# Patient Record
Sex: FEMALE | Race: OTHER | Employment: UNEMPLOYED | URBAN - METROPOLITAN AREA
[De-identification: names, ages, dates, MRNs, and addresses within clinical notes are randomized per-mention and may not be internally consistent; named-entity substitution may affect disease eponyms.]

---
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
## 2019-03-26 ENCOUNTER — TELEPHONE (OUTPATIENT)
Dept: PEDIATRICS CLINIC | Facility: CLINIC | Age: 19
End: 2019-03-26

## 2019-03-26 NOTE — TELEPHONE ENCOUNTER
To provider for medication refill review; Pharmacy refill request.   Per Med List;   Doxycycline Hyclate 100mg Oral Cap     Well-exam with provider on 3/9/18     Okay for fill? Have patient follow up with adult medicine?

## 2019-03-26 NOTE — TELEPHONE ENCOUNTER
needs adult  Doctor now, would have run out by October 2018, so will not refill, she had time over both breaks and spring break to establish care, so needs to establish care

## 2019-08-19 ENCOUNTER — OFFICE VISIT (OUTPATIENT)
Dept: OBGYN CLINIC | Facility: CLINIC | Age: 19
End: 2019-08-19
Payer: COMMERCIAL

## 2019-08-19 VITALS
WEIGHT: 116 LBS | HEIGHT: 61 IN | SYSTOLIC BLOOD PRESSURE: 107 MMHG | BODY MASS INDEX: 21.9 KG/M2 | HEART RATE: 61 BPM | DIASTOLIC BLOOD PRESSURE: 71 MMHG

## 2019-08-19 DIAGNOSIS — Z01.419 WELL WOMAN EXAM WITH ROUTINE GYNECOLOGICAL EXAM: Primary | ICD-10-CM

## 2019-08-19 DIAGNOSIS — Z30.09 ENCOUNTER FOR COUNSELING REGARDING CONTRACEPTION: ICD-10-CM

## 2019-08-19 PROCEDURE — 99385 PREV VISIT NEW AGE 18-39: CPT | Performed by: CLINICAL NURSE SPECIALIST

## 2019-08-19 RX ORDER — CHOLECALCIFEROL (VITAMIN D3) 125 MCG
500 CAPSULE ORAL AS NEEDED
COMMUNITY

## 2019-08-19 RX ORDER — ETONOGESTREL AND ETHINYL ESTRADIOL 11.7; 2.7 MG/1; MG/1
1 INSERT, EXTENDED RELEASE VAGINAL
Qty: 1 EACH | Refills: 3 | Status: SHIPPED | OUTPATIENT
Start: 2019-08-19 | End: 2020-09-30

## 2019-08-19 NOTE — PROGRESS NOTES
Laurie Sandoval is a 22year old female No obstetric history on file. Patient's last menstrual period was 08/06/2019 (exact date). Patient presents with: Annual  New pt. 1st annual exam. No pap hx d/t age. Periods are regular.  Sexually active with new pa Relationships      Social connections:        Talks on phone: Not on file        Gets together: Not on file        Attends Muslim service: Not on file        Active member of club or organization: Not on file        Attends meetings of clubs or [place] denies excessive thirst or urination. Heme/Lymph:  denies history of anemia, easy bruising or bleeding.       PHYSICAL EXAM:   Constitutional: well developed, well nourished  Head/Face: normocephalic  Neck/Thyroid: thyroid symmetric, no thyromegaly, no nod week and use of NuvaRing Marquis. Back up x 1 month  Monthly self breast exams.    Yearly exams encouraged  RTO in 3-4 months for BP check

## 2019-09-04 ENCOUNTER — TELEPHONE (OUTPATIENT)
Dept: OBGYN CLINIC | Facility: CLINIC | Age: 19
End: 2019-09-04

## 2019-09-04 NOTE — TELEPHONE ENCOUNTER
Received PA form from [facility] pharmacy for her nuvaring that was prescribed by LIN. Spoke with Mary from [place] number provided ([phone]).  Mary stated that since pt is still listed as a child under her parents insurance, they are unable to initi

## 2019-09-13 NOTE — TELEPHONE ENCOUNTER
RECEIVED FAX FROM [employer]. COMPLETED THE FORM THEY SENT AND FAXED BACK. FORM IN FAX BIN AWAITING RSPONSE.

## 2019-09-16 NOTE — TELEPHONE ENCOUNTER
Pt states she is not under her father's insurance currently. Her father is in the process of getting her on his insurance. Pt states that once she is on his insurance, she will call us. Pt would want to start the [address].   (Currently she has no insuran

## 2019-09-16 NOTE — TELEPHONE ENCOUNTER
LMTCB. Pt needs to find out from insurance what birth control is covered with her insurance and fax it to [facility].

## 2019-10-22 ENCOUNTER — TELEPHONE (OUTPATIENT)
Dept: OBGYN CLINIC | Facility: CLINIC | Age: 19
End: 2019-10-22

## 2019-10-22 RX ORDER — ETONOGESTREL AND ETHINYL ESTRADIOL VAGINAL .015; .12 MG/D; MG/D
1 RING VAGINAL
Qty: 1 EACH | Refills: 11 | Status: CANCELLED | OUTPATIENT
Start: 2019-10-22

## 2019-10-22 NOTE — TELEPHONE ENCOUNTER
PER PATIENT IS AWAY AT SCHOOL / REQUESTING TO HAVE HER SCRIPT SEND TO ANOTHER PHARMACY / Valentin Mathias IN [facility] / Zulma Medico / PHARMACY # [phone] / Felicia Brewster / Caro Gambino

## 2019-10-22 NOTE — TELEPHONE ENCOUNTER
Called pt and informed her that I called pharmacy, [address] [place] [address] at [facility]. Informed pt that the pharmacy stated that she picked up 3 Nuvarings on 8/25/19 and had one transferred to [facility]. Pt was informed that she needs to return Nov or Dec while on active Nuvaring for BP check with MAF. Our call was disconnected. Tried to call pt back and it would not ring. Pt needs to be called again to see if she has any questions.

## 2020-09-30 ENCOUNTER — OFFICE VISIT (OUTPATIENT)
Dept: OBGYN CLINIC | Facility: CLINIC | Age: 20
End: 2020-09-30
Payer: COMMERCIAL

## 2020-09-30 VITALS
BODY MASS INDEX: 21 KG/M2 | HEART RATE: 83 BPM | WEIGHT: 110.81 LBS | DIASTOLIC BLOOD PRESSURE: 76 MMHG | SYSTOLIC BLOOD PRESSURE: 124 MMHG

## 2020-09-30 DIAGNOSIS — Z12.4 CERVICAL CANCER SCREENING: ICD-10-CM

## 2020-09-30 DIAGNOSIS — Z13.9 ENCOUNTER FOR HEALTH-RELATED SCREENING: ICD-10-CM

## 2020-09-30 DIAGNOSIS — Z76.0 MEDICATION REFILL: ICD-10-CM

## 2020-09-30 DIAGNOSIS — Z01.419 WELL WOMAN EXAM WITH ROUTINE GYNECOLOGICAL EXAM: Primary | ICD-10-CM

## 2020-09-30 PROCEDURE — 3078F DIAST BP <80 MM HG: CPT | Performed by: CLINICAL NURSE SPECIALIST

## 2020-09-30 PROCEDURE — 99395 PREV VISIT EST AGE 18-39: CPT | Performed by: CLINICAL NURSE SPECIALIST

## 2020-09-30 PROCEDURE — 3074F SYST BP LT 130 MM HG: CPT | Performed by: CLINICAL NURSE SPECIALIST

## 2020-09-30 RX ORDER — ETONOGESTREL AND ETHINYL ESTRADIOL 11.7; 2.7 MG/1; MG/1
1 INSERT, EXTENDED RELEASE VAGINAL
Qty: 3 RING | Refills: 4 | Status: SHIPPED | OUTPATIENT
Start: 2020-09-30 | End: 2022-01-24

## 2020-10-05 NOTE — PROGRESS NOTES
Elta Leventhal is a 23year old female [facility] Patient's last menstrual period was 09/03/2020. Patient presents with:  Gyn Exam: ANNUAL EXAM   Medication Request: Ericka Avelar   Last annual exam was last year. Will do 1st pap today.  Periods are regular with together: Not on file        Attends Taoist service: Not on file        Active member of club or organization: Not on file        Attends meetings of clubs or organizations: Not on file        Relationship status: Not on file      Intimate partner viole urination. Heme/Lymph:  denies history of anemia, easy bruising or bleeding. PHYSICAL EXAM:   /76   Pulse 83   Wt 110 lb 12.8 oz (50.3 kg)   LMP 09/03/2020   BMI 20.94 kg/m²     Body mass index is 20.94 kg/m².     Constitutional: well developed, urged. Declines STD testing  Exercise and healthy lifestyle discussed  Refill rx for NuvaRing sent  Monthly self breast exams.    Yearly exams encouraged

## 2021-08-01 ENCOUNTER — APPOINTMENT (OUTPATIENT)
Dept: GENERAL RADIOLOGY | Age: 21
End: 2021-08-01
Attending: NURSE PRACTITIONER
Payer: COMMERCIAL

## 2021-08-01 ENCOUNTER — HOSPITAL ENCOUNTER (OUTPATIENT)
Age: 21
Discharge: HOME | End: 2021-08-01
Payer: COMMERCIAL

## 2021-08-01 VITALS
WEIGHT: 115 LBS | TEMPERATURE: 99 F | HEIGHT: 61 IN | SYSTOLIC BLOOD PRESSURE: 121 MMHG | OXYGEN SATURATION: 100 % | DIASTOLIC BLOOD PRESSURE: 77 MMHG | HEART RATE: 92 BPM | BODY MASS INDEX: 21.71 KG/M2 | RESPIRATION RATE: 20 BRPM

## 2021-08-01 DIAGNOSIS — R05.9 COUGH: Primary | ICD-10-CM

## 2021-08-01 LAB — S PYO AG THROAT QL: NEGATIVE

## 2021-08-01 PROCEDURE — 87880 STREP A ASSAY W/OPTIC: CPT | Performed by: NURSE PRACTITIONER

## 2021-08-01 PROCEDURE — 71046 X-RAY EXAM CHEST 2 VIEWS: CPT | Performed by: NURSE PRACTITIONER

## 2021-08-01 PROCEDURE — 99203 OFFICE O/P NEW LOW 30 MIN: CPT | Performed by: NURSE PRACTITIONER

## 2021-08-01 RX ORDER — BENZONATATE 100 MG/1
100 CAPSULE ORAL 3 TIMES DAILY PRN
Qty: 30 CAPSULE | Refills: 0 | Status: SHIPPED | OUTPATIENT
Start: 2021-08-01 | End: 2021-08-31

## 2021-08-01 RX ORDER — ALBUTEROL SULFATE 90 UG/1
2 AEROSOL, METERED RESPIRATORY (INHALATION) EVERY 4 HOURS PRN
Qty: 1 EACH | Refills: 0 | Status: SHIPPED | OUTPATIENT
Start: 2021-08-01 | End: 2021-08-31

## 2021-08-01 RX ORDER — ACETAMINOPHEN 500 MG
1000 TABLET ORAL ONCE
Status: COMPLETED | OUTPATIENT
Start: 2021-08-01 | End: 2021-08-01

## 2021-08-01 NOTE — ED PROVIDER NOTES
Patient Seen in: [facility] [place]      History   Patient presents with:  Cough  Finger Pain    Stated Complaint: URI and nail pain on pinkie    HPI/Subjective:   21yo female to IC with c/o cough for the last 4 days. Low grade fever.  Had covid test distress. Appearance: Normal appearance. She is normal weight. She is not ill-appearing or toxic-appearing. HENT:      Head: Normocephalic and atraumatic. Nose: Congestion present. No rhinorrhea.       Mouth/Throat:      Mouth: Mucous membranes a evident acute intrathoracic process. Remote.               Dictated by (CST): Asa Corea MD on 8/01/2021 at 2:50 PM         Finalized by (CST): Asa Corea MD on 8/01/2021 at 2:50 PM                        MDM   Strep negative  Ch

## 2021-08-01 NOTE — ED INITIAL ASSESSMENT (HPI)
Pt complaining of cough and congestion with post nasal drip since Tuesday, pt was in [address] at the time and just got back yesterday. Pt was taking otc Medications from [place].  No fever. Pt has left 5th finger pain from playing volleyball.   The nail li

## 2021-08-02 LAB — SARS-COV-2 RNA RESP QL NAA+PROBE: NOT DETECTED

## 2021-08-04 ENCOUNTER — TELEMEDICINE (OUTPATIENT)
Dept: INTERNAL MEDICINE CLINIC | Facility: CLINIC | Age: 21
End: 2021-08-04

## 2021-08-04 DIAGNOSIS — J01.90 ACUTE NON-RECURRENT SINUSITIS, UNSPECIFIED LOCATION: Primary | ICD-10-CM

## 2021-08-04 DIAGNOSIS — R50.9 FEVER, UNSPECIFIED FEVER CAUSE: ICD-10-CM

## 2021-08-04 PROCEDURE — 99203 OFFICE O/P NEW LOW 30 MIN: CPT | Performed by: INTERNAL MEDICINE

## 2021-08-04 RX ORDER — AZITHROMYCIN 250 MG/1
TABLET, FILM COATED ORAL
Qty: 6 TABLET | Refills: 0 | Status: SHIPPED | OUTPATIENT
Start: 2021-08-04 | End: 2021-08-09

## 2021-08-04 NOTE — PROGRESS NOTES
Patient ID: Roz Byrd is a 23year old female. Patient presents with: Follow - Up         HISTORY OF PRESENT ILLNESS:   Patient presents for above. This visit is conducted using Telemedicine with live, interactive video and audio.   Patient w Allergies:No Known Allergies    Social History    Socioeconomic History      Marital status: Single      Spouse name: Not on file      Number of children: Not on file      Years of education: Not on file      Highest education level: Not on file    Occ location  · azithromycin 250 MG Oral Tab; Take 2 tablets (500 mg total) by mouth daily for 1 day, THEN 1 tablet (250 mg total) daily for 4 days. Dispense: 6 tablet; Refill: 0  · Symptomatic treatment with hot showers, stimulation, fluids, Tamika Mccollum.   · and other related consent forms and documents. which are located on [facility]. The patient verbally agreed to telehealth consent form, related consents and the risks discussed. Lastly, the patient confirmed that they were in [place].    Included

## 2022-01-20 DIAGNOSIS — Z76.0 MEDICATION REFILL: ICD-10-CM

## 2022-01-20 RX ORDER — ETONOGESTREL AND ETHINYL ESTRADIOL .12; .015 MG/D; MG/D
RING VAGINAL
Qty: 1 RING | Refills: 13 | OUTPATIENT
Start: 2022-01-20

## 2022-01-20 NOTE — TELEPHONE ENCOUNTER
Requested Prescriptions     Pending Prescriptions Disp Refills   • [address] 0.12-0.015 MG/24HR Vaginal Ring [Pharmacy Med Name: Lastcurtisu 60 1 ring 13     Sig: INSERT 1 RING VAGINALLY EVERY 28 DAYS     Last annual 9/30/20  Last filled 9/30/20  Pap

## 2022-01-21 DIAGNOSIS — Z76.0 MEDICATION REFILL: ICD-10-CM

## 2022-01-22 RX ORDER — ETONOGESTREL AND ETHINYL ESTRADIOL 11.7; 2.7 MG/1; MG/1
1 INSERT, EXTENDED RELEASE VAGINAL
Qty: 3 RING | Refills: 0 | Status: CANCELLED | OUTPATIENT
Start: 2022-01-22

## 2022-01-22 NOTE — TELEPHONE ENCOUNTER
Last annual - 9/30/2020  Last pap - 9/30/2020, normal      Annual scheduled on 3/30/2022. Message to [facility]. Ok to fill rx until appt? Rx pended.

## 2022-01-23 ENCOUNTER — PATIENT MESSAGE (OUTPATIENT)
Dept: OBGYN CLINIC | Facility: CLINIC | Age: 22
End: 2022-01-23

## 2022-01-23 DIAGNOSIS — Z76.0 MEDICATION REFILL: ICD-10-CM

## 2022-01-24 RX ORDER — ETONOGESTREL AND ETHINYL ESTRADIOL 11.7; 2.7 MG/1; MG/1
1 INSERT, EXTENDED RELEASE VAGINAL
Qty: 3 RING | Refills: 0 | Status: SHIPPED | OUTPATIENT
Start: 2022-01-24

## 2022-01-24 NOTE — TELEPHONE ENCOUNTER
Requested Prescriptions     Pending Prescriptions Disp Refills   • Etonogestrel-Ethinyl Estradiol (NUVARING) 0.12-0.015 MG/24HR Vaginal Ring 3 ring 4     Sig: Place 1 ring vaginally every 28 days.      Last annual 9/30/20  Last filled 9/30/20  Pap UTD    Ne

## 2022-04-06 ENCOUNTER — LAB ENCOUNTER (OUTPATIENT)
Dept: LAB | Facility: HOSPITAL | Age: 22
End: 2022-04-06
Attending: CLINICAL NURSE SPECIALIST
Payer: COMMERCIAL

## 2022-04-06 ENCOUNTER — OFFICE VISIT (OUTPATIENT)
Dept: OBGYN CLINIC | Facility: CLINIC | Age: 22
End: 2022-04-06
Payer: COMMERCIAL

## 2022-04-06 VITALS
HEIGHT: 61 IN | WEIGHT: 121 LBS | DIASTOLIC BLOOD PRESSURE: 78 MMHG | BODY MASS INDEX: 22.84 KG/M2 | HEART RATE: 106 BPM | SYSTOLIC BLOOD PRESSURE: 123 MMHG

## 2022-04-06 DIAGNOSIS — Z76.0 MEDICATION REFILL: ICD-10-CM

## 2022-04-06 DIAGNOSIS — R20.8 COLD HANDS AND FEET WITHOUT PERIPHERAL VASCULAR DISEASE: ICD-10-CM

## 2022-04-06 DIAGNOSIS — Z01.419 WELL WOMAN EXAM WITH ROUTINE GYNECOLOGICAL EXAM: Primary | ICD-10-CM

## 2022-04-06 LAB
BASOPHILS # BLD AUTO: 0.03 X10(3) UL (ref 0–0.2)
BASOPHILS NFR BLD AUTO: 0.4 %
DEPRECATED HBV CORE AB SER IA-ACNC: 13.8 NG/ML
DEPRECATED RDW RBC AUTO: 40.5 FL (ref 35.1–46.3)
EOSINOPHIL # BLD AUTO: 0.04 X10(3) UL (ref 0–0.7)
EOSINOPHIL NFR BLD AUTO: 0.5 %
ERYTHROCYTE [DISTWIDTH] IN BLOOD BY AUTOMATED COUNT: 12.8 % (ref 11–15)
HCT VFR BLD AUTO: 37.4 %
HGB BLD-MCNC: 12.2 G/DL
IMM GRANULOCYTES # BLD AUTO: 0.02 X10(3) UL (ref 0–1)
IMM GRANULOCYTES NFR BLD: 0.3 %
IRON SATN MFR SERPL: 14 %
IRON SERPL-MCNC: 83 UG/DL
LYMPHOCYTES # BLD AUTO: 2.67 X10(3) UL (ref 1–4)
LYMPHOCYTES NFR BLD AUTO: 35.2 %
MCH RBC QN AUTO: 28.5 PG (ref 26–34)
MCHC RBC AUTO-ENTMCNC: 32.6 G/DL (ref 31–37)
MCV RBC AUTO: 87.4 FL
MONOCYTES # BLD AUTO: 0.5 X10(3) UL (ref 0.1–1)
MONOCYTES NFR BLD AUTO: 6.6 %
NEUTROPHILS # BLD AUTO: 4.32 X10 (3) UL (ref 1.5–7.7)
NEUTROPHILS # BLD AUTO: 4.32 X10(3) UL (ref 1.5–7.7)
NEUTROPHILS NFR BLD AUTO: 57 %
PLATELET # BLD AUTO: 268 10(3)UL (ref 150–450)
RBC # BLD AUTO: 4.28 X10(6)UL
T4 FREE SERPL-MCNC: 0.9 NG/DL (ref 0.8–1.7)
TIBC SERPL-MCNC: 587 UG/DL (ref 240–450)
TRANSFERRIN SERPL-MCNC: 394 MG/DL (ref 200–360)
TSI SER-ACNC: 0.96 MIU/ML (ref 0.36–3.74)
WBC # BLD AUTO: 7.6 X10(3) UL (ref 4–11)

## 2022-04-06 PROCEDURE — 84443 ASSAY THYROID STIM HORMONE: CPT

## 2022-04-06 PROCEDURE — 3074F SYST BP LT 130 MM HG: CPT | Performed by: CLINICAL NURSE SPECIALIST

## 2022-04-06 PROCEDURE — 84466 ASSAY OF TRANSFERRIN: CPT

## 2022-04-06 PROCEDURE — 84439 ASSAY OF FREE THYROXINE: CPT

## 2022-04-06 PROCEDURE — 3078F DIAST BP <80 MM HG: CPT | Performed by: CLINICAL NURSE SPECIALIST

## 2022-04-06 PROCEDURE — 36415 COLL VENOUS BLD VENIPUNCTURE: CPT

## 2022-04-06 PROCEDURE — 82728 ASSAY OF FERRITIN: CPT

## 2022-04-06 PROCEDURE — 83540 ASSAY OF IRON: CPT

## 2022-04-06 PROCEDURE — 85025 COMPLETE CBC W/AUTO DIFF WBC: CPT

## 2022-04-06 PROCEDURE — 99395 PREV VISIT EST AGE 18-39: CPT | Performed by: CLINICAL NURSE SPECIALIST

## 2022-04-06 PROCEDURE — 3008F BODY MASS INDEX DOCD: CPT | Performed by: CLINICAL NURSE SPECIALIST

## 2022-04-06 RX ORDER — ETONOGESTREL AND ETHINYL ESTRADIOL 11.7; 2.7 MG/1; MG/1
1 INSERT, EXTENDED RELEASE VAGINAL
Qty: 1 RING | Refills: 14 | Status: SHIPPED | OUTPATIENT
Start: 2022-04-06

## 2022-04-18 ENCOUNTER — HOSPITAL ENCOUNTER (OUTPATIENT)
Age: 22
Discharge: HOME | End: 2022-04-18
Payer: COMMERCIAL

## 2022-04-18 VITALS
HEART RATE: 83 BPM | BODY MASS INDEX: 23.03 KG/M2 | WEIGHT: 122 LBS | RESPIRATION RATE: 20 BRPM | DIASTOLIC BLOOD PRESSURE: 81 MMHG | OXYGEN SATURATION: 99 % | SYSTOLIC BLOOD PRESSURE: 124 MMHG | TEMPERATURE: 98 F | HEIGHT: 61 IN

## 2022-04-18 DIAGNOSIS — J06.9 VIRAL UPPER RESPIRATORY TRACT INFECTION: ICD-10-CM

## 2022-04-18 DIAGNOSIS — Z20.822 ENCOUNTER FOR LABORATORY TESTING FOR COVID-19 VIRUS: Primary | ICD-10-CM

## 2022-04-18 DIAGNOSIS — J02.9 VIRAL PHARYNGITIS: ICD-10-CM

## 2022-04-18 LAB
POCT MONO: NEGATIVE
S PYO AG THROAT QL: NEGATIVE
SARS-COV-2 RNA RESP QL NAA+PROBE: NOT DETECTED

## 2022-04-18 PROCEDURE — 87880 STREP A ASSAY W/OPTIC: CPT

## 2022-04-18 PROCEDURE — U0002 COVID-19 LAB TEST NON-CDC: HCPCS

## 2022-04-18 PROCEDURE — 99213 OFFICE O/P EST LOW 20 MIN: CPT

## 2022-04-18 PROCEDURE — 36415 COLL VENOUS BLD VENIPUNCTURE: CPT

## 2022-04-18 PROCEDURE — 86308 HETEROPHILE ANTIBODY SCREEN: CPT

## 2022-09-06 ENCOUNTER — HOSPITAL ENCOUNTER (OUTPATIENT)
Age: 22
Discharge: HOME | End: 2022-09-06
Payer: COMMERCIAL

## 2022-09-06 PROCEDURE — 90471 IMMUNIZATION ADMIN: CPT | Performed by: NURSE PRACTITIONER

## 2022-09-06 PROCEDURE — 90675 RABIES VACCINE IM: CPT | Performed by: NURSE PRACTITIONER

## 2022-09-06 RX ORDER — AMOXICILLIN AND CLAVULANATE POTASSIUM 875; 125 MG/1; MG/1
TABLET, FILM COATED ORAL 2 TIMES DAILY
COMMUNITY
Start: 2022-09-04

## 2022-09-06 NOTE — ED INITIAL ASSESSMENT (HPI)
Patient here for her 2nd Rabies vaccine. She started her series at [facility] in [address] [place] [address] following being bitten in the right hand by a samano barn cat.

## 2022-09-06 NOTE — ED QUICK NOTES
Patient tolerated the rabies vaccine to the left deltoid without any problems. She is aware to return on 9/10/22 for her 3rd vaccine.

## 2022-09-10 ENCOUNTER — HOSPITAL ENCOUNTER (OUTPATIENT)
Age: 22
Discharge: HOME | End: 2022-09-10
Payer: COMMERCIAL

## 2022-09-18 ENCOUNTER — HOSPITAL ENCOUNTER (OUTPATIENT)
Age: 22
Discharge: HOME | End: 2022-09-18
Payer: COMMERCIAL

## 2022-09-18 ENCOUNTER — TELEPHONE (OUTPATIENT)
Dept: CASE MANAGEMENT | Facility: HOSPITAL | Age: 22
End: 2022-09-18

## 2022-09-18 VITALS — TEMPERATURE: 99 F

## 2022-12-29 ENCOUNTER — OFFICE VISIT (OUTPATIENT)
Dept: FAMILY MEDICINE CLINIC | Facility: CLINIC | Age: 22
End: 2022-12-29
Payer: COMMERCIAL

## 2022-12-29 VITALS
WEIGHT: 120 LBS | SYSTOLIC BLOOD PRESSURE: 112 MMHG | HEART RATE: 83 BPM | DIASTOLIC BLOOD PRESSURE: 73 MMHG | HEIGHT: 61 IN | BODY MASS INDEX: 22.66 KG/M2

## 2022-12-29 DIAGNOSIS — Z76.89 ENCOUNTER TO ESTABLISH CARE: Primary | ICD-10-CM

## 2022-12-29 DIAGNOSIS — J35.8 TONSILLITH: ICD-10-CM

## 2022-12-29 PROCEDURE — 3074F SYST BP LT 130 MM HG: CPT | Performed by: STUDENT IN AN ORGANIZED HEALTH CARE EDUCATION/TRAINING PROGRAM

## 2022-12-29 PROCEDURE — 3078F DIAST BP <80 MM HG: CPT | Performed by: STUDENT IN AN ORGANIZED HEALTH CARE EDUCATION/TRAINING PROGRAM

## 2022-12-29 PROCEDURE — 99202 OFFICE O/P NEW SF 15 MIN: CPT | Performed by: STUDENT IN AN ORGANIZED HEALTH CARE EDUCATION/TRAINING PROGRAM

## 2022-12-29 PROCEDURE — 3008F BODY MASS INDEX DOCD: CPT | Performed by: STUDENT IN AN ORGANIZED HEALTH CARE EDUCATION/TRAINING PROGRAM

## 2022-12-29 RX ORDER — FLUTICASONE PROPIONATE 50 MCG
1 SPRAY, SUSPENSION (ML) NASAL DAILY
Qty: 1 EACH | Refills: 0 | Status: SHIPPED | OUTPATIENT
Start: 2022-12-29

## 2023-01-03 ENCOUNTER — OFFICE VISIT (OUTPATIENT)
Dept: OTOLARYNGOLOGY | Facility: CLINIC | Age: 23
End: 2023-01-03
Payer: COMMERCIAL

## 2023-01-03 VITALS — BODY MASS INDEX: 23 KG/M2 | WEIGHT: 120 LBS

## 2023-01-03 DIAGNOSIS — J35.8 TONSILLITH: Primary | ICD-10-CM

## 2023-01-03 PROCEDURE — 99204 OFFICE O/P NEW MOD 45 MIN: CPT | Performed by: STUDENT IN AN ORGANIZED HEALTH CARE EDUCATION/TRAINING PROGRAM

## 2023-11-20 ENCOUNTER — OFFICE VISIT (OUTPATIENT)
Dept: OBGYN CLINIC | Facility: CLINIC | Age: 23
End: 2023-11-20

## 2023-11-20 ENCOUNTER — LAB ENCOUNTER (OUTPATIENT)
Dept: LAB | Age: 23
End: 2023-11-20
Attending: STUDENT IN AN ORGANIZED HEALTH CARE EDUCATION/TRAINING PROGRAM
Payer: COMMERCIAL

## 2023-11-20 VITALS
HEART RATE: 70 BPM | WEIGHT: 128 LBS | SYSTOLIC BLOOD PRESSURE: 111 MMHG | BODY MASS INDEX: 24.17 KG/M2 | DIASTOLIC BLOOD PRESSURE: 72 MMHG | HEIGHT: 61 IN

## 2023-11-20 DIAGNOSIS — Z01.419 WELL WOMAN EXAM WITH ROUTINE GYNECOLOGICAL EXAM: ICD-10-CM

## 2023-11-20 DIAGNOSIS — Z11.3 SCREENING EXAMINATION FOR STD (SEXUALLY TRANSMITTED DISEASE): ICD-10-CM

## 2023-11-20 DIAGNOSIS — Z12.4 SCREENING FOR CERVICAL CANCER: Primary | ICD-10-CM

## 2023-11-20 LAB
BASOPHILS # BLD AUTO: 0.03 X10(3) UL (ref 0–0.2)
BASOPHILS NFR BLD AUTO: 0.4 %
DEPRECATED HBV CORE AB SER IA-ACNC: 19.8 NG/ML
DEPRECATED RDW RBC AUTO: 39.4 FL (ref 35.1–46.3)
EOSINOPHIL # BLD AUTO: 0.07 X10(3) UL (ref 0–0.7)
EOSINOPHIL NFR BLD AUTO: 1 %
ERYTHROCYTE [DISTWIDTH] IN BLOOD BY AUTOMATED COUNT: 12.1 % (ref 11–15)
HCT VFR BLD AUTO: 37.9 %
HGB BLD-MCNC: 12.6 G/DL
IMM GRANULOCYTES # BLD AUTO: 0.01 X10(3) UL (ref 0–1)
IMM GRANULOCYTES NFR BLD: 0.1 %
LYMPHOCYTES # BLD AUTO: 2.54 X10(3) UL (ref 1–4)
LYMPHOCYTES NFR BLD AUTO: 37.9 %
MCH RBC QN AUTO: 29.6 PG (ref 26–34)
MCHC RBC AUTO-ENTMCNC: 33.2 G/DL (ref 31–37)
MCV RBC AUTO: 89 FL
MONOCYTES # BLD AUTO: 0.59 X10(3) UL (ref 0.1–1)
MONOCYTES NFR BLD AUTO: 8.8 %
NEUTROPHILS # BLD AUTO: 3.47 X10 (3) UL (ref 1.5–7.7)
NEUTROPHILS # BLD AUTO: 3.47 X10(3) UL (ref 1.5–7.7)
NEUTROPHILS NFR BLD AUTO: 51.8 %
PLATELET # BLD AUTO: 272 10(3)UL (ref 150–450)
RBC # BLD AUTO: 4.26 X10(6)UL
TSI SER-ACNC: 2.49 MIU/ML (ref 0.55–4.78)
WBC # BLD AUTO: 6.7 X10(3) UL (ref 4–11)

## 2023-11-20 PROCEDURE — 3078F DIAST BP <80 MM HG: CPT | Performed by: STUDENT IN AN ORGANIZED HEALTH CARE EDUCATION/TRAINING PROGRAM

## 2023-11-20 PROCEDURE — 3008F BODY MASS INDEX DOCD: CPT | Performed by: STUDENT IN AN ORGANIZED HEALTH CARE EDUCATION/TRAINING PROGRAM

## 2023-11-20 PROCEDURE — 36415 COLL VENOUS BLD VENIPUNCTURE: CPT

## 2023-11-20 PROCEDURE — 84443 ASSAY THYROID STIM HORMONE: CPT

## 2023-11-20 PROCEDURE — 82728 ASSAY OF FERRITIN: CPT

## 2023-11-20 PROCEDURE — 3074F SYST BP LT 130 MM HG: CPT | Performed by: STUDENT IN AN ORGANIZED HEALTH CARE EDUCATION/TRAINING PROGRAM

## 2023-11-20 PROCEDURE — 85025 COMPLETE CBC W/AUTO DIFF WBC: CPT

## 2023-11-20 PROCEDURE — 99385 PREV VISIT NEW AGE 18-39: CPT | Performed by: STUDENT IN AN ORGANIZED HEALTH CARE EDUCATION/TRAINING PROGRAM

## 2023-11-20 RX ORDER — TRETINOIN 0.5 MG/G
CREAM TOPICAL
COMMUNITY
Start: 2023-08-07

## 2023-11-20 RX ORDER — ETONOGESTREL AND ETHINYL ESTRADIOL VAGINAL .015; .12 MG/D; MG/D
1 RING VAGINAL
Qty: 1 RING | Refills: 11 | Status: SHIPPED | OUTPATIENT
Start: 2023-11-20

## 2023-11-20 RX ORDER — SPIRONOLACTONE 100 MG/1
TABLET, FILM COATED ORAL
COMMUNITY
Start: 2023-09-24

## 2023-11-21 LAB
C TRACH DNA SPEC QL NAA+PROBE: NEGATIVE
N GONORRHOEA DNA SPEC QL NAA+PROBE: NEGATIVE

## 2023-11-24 LAB — LAST PAP RESULT: NORMAL

## 2023-12-26 ENCOUNTER — TELEPHONE (OUTPATIENT)
Dept: FAMILY MEDICINE CLINIC | Facility: CLINIC | Age: 23
End: 2023-12-26

## 2024-07-10 ENCOUNTER — OFFICE VISIT (OUTPATIENT)
Dept: FAMILY MEDICINE CLINIC | Facility: CLINIC | Age: 24
End: 2024-07-10

## 2024-07-10 ENCOUNTER — LAB ENCOUNTER (OUTPATIENT)
Dept: LAB | Age: 24
End: 2024-07-10
Payer: COMMERCIAL

## 2024-07-10 VITALS
DIASTOLIC BLOOD PRESSURE: 69 MMHG | HEART RATE: 70 BPM | BODY MASS INDEX: 24.17 KG/M2 | TEMPERATURE: 98 F | RESPIRATION RATE: 16 BRPM | SYSTOLIC BLOOD PRESSURE: 104 MMHG | HEIGHT: 61 IN | WEIGHT: 128 LBS | OXYGEN SATURATION: 97 %

## 2024-07-10 DIAGNOSIS — Z00.00 ROUTINE PHYSICAL EXAMINATION: ICD-10-CM

## 2024-07-10 DIAGNOSIS — N92.6 MENSTRUAL IRREGULARITY: ICD-10-CM

## 2024-07-10 DIAGNOSIS — D22.9 ATYPICAL NEVI: ICD-10-CM

## 2024-07-10 DIAGNOSIS — Z00.00 ROUTINE PHYSICAL EXAMINATION: Primary | ICD-10-CM

## 2024-07-10 DIAGNOSIS — R51.9 NONINTRACTABLE EPISODIC HEADACHE, UNSPECIFIED HEADACHE TYPE: ICD-10-CM

## 2024-07-10 LAB
BASOPHILS # BLD AUTO: 0.03 X10(3) UL (ref 0–0.2)
BASOPHILS NFR BLD AUTO: 0.4 %
DEPRECATED HBV CORE AB SER IA-ACNC: 36.3 NG/ML
DEPRECATED RDW RBC AUTO: 39.6 FL (ref 35.1–46.3)
EOSINOPHIL # BLD AUTO: 0.1 X10(3) UL (ref 0–0.7)
EOSINOPHIL NFR BLD AUTO: 1.4 %
ERYTHROCYTE [DISTWIDTH] IN BLOOD BY AUTOMATED COUNT: 12.1 % (ref 11–15)
HCT VFR BLD AUTO: 38.6 %
HGB BLD-MCNC: 13.1 G/DL
IMM GRANULOCYTES # BLD AUTO: 0 X10(3) UL (ref 0–1)
IMM GRANULOCYTES NFR BLD: 0 %
LYMPHOCYTES # BLD AUTO: 2.37 X10(3) UL (ref 1–4)
LYMPHOCYTES NFR BLD AUTO: 32 %
MCH RBC QN AUTO: 30.5 PG (ref 26–34)
MCHC RBC AUTO-ENTMCNC: 33.9 G/DL (ref 31–37)
MCV RBC AUTO: 89.8 FL
MONOCYTES # BLD AUTO: 0.6 X10(3) UL (ref 0.1–1)
MONOCYTES NFR BLD AUTO: 8.1 %
NEUTROPHILS # BLD AUTO: 4.3 X10 (3) UL (ref 1.5–7.7)
NEUTROPHILS # BLD AUTO: 4.3 X10(3) UL (ref 1.5–7.7)
NEUTROPHILS NFR BLD AUTO: 58.1 %
PLATELET # BLD AUTO: 239 10(3)UL (ref 150–450)
RBC # BLD AUTO: 4.3 X10(6)UL
TSI SER-ACNC: 1.65 MIU/ML (ref 0.55–4.78)
WBC # BLD AUTO: 7.4 X10(3) UL (ref 4–11)

## 2024-07-10 PROCEDURE — 3008F BODY MASS INDEX DOCD: CPT

## 2024-07-10 PROCEDURE — 99395 PREV VISIT EST AGE 18-39: CPT

## 2024-07-10 PROCEDURE — 3078F DIAST BP <80 MM HG: CPT

## 2024-07-10 PROCEDURE — 36415 COLL VENOUS BLD VENIPUNCTURE: CPT

## 2024-07-10 PROCEDURE — 3074F SYST BP LT 130 MM HG: CPT

## 2024-07-10 PROCEDURE — 82728 ASSAY OF FERRITIN: CPT

## 2024-07-10 PROCEDURE — 84443 ASSAY THYROID STIM HORMONE: CPT

## 2024-07-10 PROCEDURE — 85025 COMPLETE CBC W/AUTO DIFF WBC: CPT

## 2024-07-10 NOTE — PROGRESS NOTES
HPI:    Patient ID: Sonal Townsend is a 23 year old female.    HPI  Chief Complaint   Patient presents with    Physical     Annual px     Patient here in office for physical. Pap completed on 11/2023 and was normal. Reports irregular menstrual cycles/bleeding when she misses a dose of spironolactone (take medication for acne).       Also complains of intermittent headaches, occurring for past 3-4 days this week. Denies nausea, vomiting, or light sensitivity . States she has recently starting adding small amount of coffee to her match and tea, unsure if related. Also not sleeping well at night and has increased screen time lately.     Requesting copy of immunization record.     Review of Systems   Constitutional: Negative.  Negative for fever.   HENT: Negative.  Negative for ear pain and sore throat.    Eyes: Negative.  Negative for visual disturbance.   Respiratory: Negative.  Negative for cough and shortness of breath.    Cardiovascular: Negative.  Negative for chest pain and palpitations.   Gastrointestinal: Negative.  Negative for abdominal pain, blood in stool, constipation and diarrhea.   Genitourinary:  Positive for menstrual problem. Negative for dysuria, frequency and hematuria.   Musculoskeletal: Negative.  Negative for arthralgias and myalgias.   Skin: Negative.  Negative for rash.   Neurological:  Positive for headaches. Negative for dizziness and facial asymmetry.   Psychiatric/Behavioral: Negative.         /69   Pulse 70   Temp 97.5 °F (36.4 °C) (Temporal)   Resp 16   Ht 5' 1\" (1.549 m)   Wt 128 lb (58.1 kg)   LMP 06/15/2024 (Approximate)   SpO2 97%   BMI 24.19 kg/m²     Past Medical History:    Dermatitis    Dysuria    Infectious mononucleosis     History reviewed. No pertinent surgical history.  Social History     Socioeconomic History    Marital status: Single     Spouse name: Not on file    Number of children: Not on file    Years of education: Not on file    Highest education  level: Not on file   Occupational History    Not on file   Tobacco Use    Smoking status: Never    Smokeless tobacco: Never   Vaping Use    Vaping status: Never Used   Substance and Sexual Activity    Alcohol use: Yes     Comment: socially     Drug use: Yes     Types: Cannabis     Comment: pt stated she smoked marijuana once     Sexual activity: Yes     Partners: Male     Birth control/protection: Inserts     Comment: same partner   Other Topics Concern    Not on file   Social History Narrative    9th Grade    Volleyball    Basketball    Soccer     Social Determinants of Health     Financial Resource Strain: Not on file   Food Insecurity: Not on file   Transportation Needs: Not on file   Physical Activity: Not on file   Stress: Not on file   Social Connections: Not on file   Housing Stability: Not on file     Family History   Problem Relation Age of Onset    Other (Other) Mother         diverticulitis    Other (endometriosis) Mother     Diabetes Maternal Grandmother     Cancer Maternal Grandmother         bone cancer     Diabetes Maternal Grandfather     Other (alzheimer) Maternal Grandfather     Dementia Paternal Grandfather        Immunization History   Administered Date(s) Administered    Covid-19 Vaccine Pfizer 30 mcg/0.3 ml 04/13/2021, 05/03/2021, 12/09/2021    DTAP 12/09/1999, 01/28/2000, 04/11/2000, 07/17/2001, 08/12/2005    DTP 12/09/1999, 01/28/2000, 04/11/2000, 07/17/2001, 08/12/2005    HEP A 08/30/2013    HEP A,Ped/Adol,(2 Dose) 08/30/2013, 08/20/2014    HEP B 12/09/1999, 01/28/2000, 07/17/2001    HIB 12/09/1999, 01/28/2000, 07/17/2001    HPV (Gardasil) 08/25/2015, 02/27/2017    Hep B, Unspecified Formulation 12/09/1999, 01/28/2000, 07/17/2001    Hib, Unspecified Formulation 12/09/1999, 01/28/2000, 07/17/2001    Hpv Virus Vaccine 9 Nolvia Im 08/25/2015, 02/27/2017, 03/09/2018    IPV 12/09/1999, 01/28/2000, 10/02/2001, 08/12/2005    MMR 10/17/2000, 10/17/2000, 08/12/2005, 08/12/2005    Meningococcal  (Menomune) 08/30/2013    Meningococcal B, Omv 03/09/2018    Meningococcal-Menactra 02/27/2017    Meningococcal-Menveo 2month-55yr 08/30/2013    OPV 12/09/1999    Pneumococcal (Prevnar 7) 07/17/2001, 10/02/2001    Pneumococcal Vaccine, Conjugate 07/17/2001, 10/02/2001    Rabies 09/03/2022, 09/06/2022, 09/10/2022, 09/18/2022    TDAP 08/24/2011, 09/03/2022    Tb Intradermal Test 09/05/2017, 09/13/2017    Varicella 10/17/2000, 08/24/2011       Health Maintenance   Topic Date Due    Annual Physical  Never done    COVID-19 Vaccine (4 - 2023-24 season) 09/01/2023    Influenza Vaccine (1) 10/01/2024    Pap Smear  11/20/2026    DTaP,Tdap,and Td Vaccines (8 - Td or Tdap) 09/03/2032    Annual Depression Screening  Completed    HPV Vaccines  Completed    Pneumococcal Vaccine: Birth to 64yrs  Aged Out          Current Outpatient Medications   Medication Sig Dispense Refill    NON FORMULARY OTC iron supp      spironolactone 100 MG Oral Tab TAKE 1 TABLET BY MOUTH EVERY DAY REFER TO PRACTICE SPIRONOLACTONE HANDOUT      Tretinoin 0.05 % External Cream APPLY TO AFFECTED AREA AT BEDTIME REFER TO PRACTICE HANDOUT FOR SPECIFIC INSTRUCTIONS. FOR FACE      Ergocalciferol (VITAMIN D OR) Take by mouth.      Vitamin B-12 500 MCG Oral Tab Take 1 tablet (500 mcg total) by mouth as needed.      Probiotic Product (PROBIOTIC-10) Oral Chew Tab Chew by mouth as needed.       Allergies:No Known Allergies   PHYSICAL EXAM:     Chief Complaint   Patient presents with    Physical     Annual px      Physical Exam  Vitals reviewed.   Constitutional:       General: She is not in acute distress.     Appearance: Normal appearance. She is well-developed. She is not ill-appearing.   HENT:      Head: Normocephalic and atraumatic.      Right Ear: Tympanic membrane, ear canal and external ear normal. There is no impacted cerumen.      Left Ear: Tympanic membrane, ear canal and external ear normal. There is no impacted cerumen.      Nose: Nose normal. No  congestion.      Mouth/Throat:      Mouth: Mucous membranes are moist.      Pharynx: Oropharynx is clear. No oropharyngeal exudate or posterior oropharyngeal erythema.   Eyes:      General:         Right eye: No discharge.         Left eye: No discharge.      Extraocular Movements: Extraocular movements intact.      Conjunctiva/sclera: Conjunctivae normal.      Pupils: Pupils are equal, round, and reactive to light.   Cardiovascular:      Rate and Rhythm: Normal rate and regular rhythm.      Heart sounds: Normal heart sounds. No murmur heard.     No friction rub. No gallop.   Pulmonary:      Effort: Pulmonary effort is normal. No respiratory distress.      Breath sounds: Normal breath sounds. No stridor. No wheezing, rhonchi or rales.   Chest:      Chest wall: No tenderness.   Abdominal:      General: Bowel sounds are normal. There is no distension.      Palpations: Abdomen is soft. There is no mass.      Tenderness: There is no abdominal tenderness. There is no right CVA tenderness, left CVA tenderness, guarding or rebound.      Hernia: No hernia is present.   Genitourinary:     General: Normal vulva.      Vagina: Normal.   Musculoskeletal:         General: No tenderness. Normal range of motion.      Cervical back: Normal range of motion and neck supple.   Lymphadenopathy:      Cervical: No cervical adenopathy.   Skin:     General: Skin is warm and dry.      Findings: No rash.      Comments: Atypical nevus on right ear lobe and left upper back    Neurological:      General: No focal deficit present.      Mental Status: She is alert and oriented to person, place, and time.      Cranial Nerves: No cranial nerve deficit.      Sensory: No sensory deficit.      Motor: No weakness.      Deep Tendon Reflexes: Reflexes are normal and symmetric.   Psychiatric:         Mood and Affect: Mood normal.         Behavior: Behavior normal.         Thought Content: Thought content normal.         Judgment: Judgment normal.                 ASSESSMENT/PLAN:     Return yearly for physicals  Follow up with dentist every 6 months  Follow up with eye doctor yearly  Recommend aerobic exercise for at least 30mins 5 days a week  Yearly flu shot  Tetanus booster every 10 years (Tdap/ Td)  Labs ordered/ or reviewed if done prior to appointment     Encounter Diagnoses   Name Primary?    Routine physical examination Yes    Nonintractable episodic headache, unspecified headache type     Menstrual irregularity     Atypical nevi        1. Routine physical examination  - CBC With Differential With Platelet; Future  - Ferritin [E]; Future    2. Nonintractable episodic headache, unspecified headache type  - Suspect related to lack of sleep vs stress vs coffee withdrawal vs other  - Return to office if not improving/worsening     3. Menstrual irregularity  - TSH W Reflex To Free T4 [E]; Future    4. Atypical nevi  - Instructed patient to monitor atypical nevi on right earlobe and left upper back. If any changes, return to office       Orders Placed This Encounter   Procedures    TSH W Reflex To Free T4 [E]    CBC With Differential With Platelet    Ferritin [E]       The above note was creating using Dragon speech recognition technology. Please excuse any typos    Meds This Visit:  Requested Prescriptions      No prescriptions requested or ordered in this encounter       Imaging & Referrals:  None         SHE Fragoso

## (undated) NOTE — LETTER
10/11/2017              Nya Small : [address]        [facility] [address]         To Whom It May Concern,    Kendra Young was seen in our office for her physical on 2017 by VICKI Salgado. 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 If you have any questions please contact our office. Thank you.        Sincerely,    VICKI Flores  [address] [facility]  [address]  [phone]

## (undated) NOTE — Clinical Note
VACCINE ADMINISTRATION RECORD  PARENT / GUARDIAN APPROVAL  Date: 2017  Vaccine administered to: Eloy George     : 2000    MRN: [id number]    A copy of the appropriate Centers for Disease Control and Prevention Vaccine Information statemen

## (undated) NOTE — Clinical Note
[facility] [employer] Child Health Examination       Student's Name  Alia Catalan D Title                           Date    (If adding dates to the above immunization history section, put your initials by date(s) and sign here.)   ALTERNATIVE PROOF OF IMMUNITY   1 Diagnosis of asthma? Child wakes during the night coughing   Yes       No    Yes       No    Loss of function of one of paired organs? (eye/ear/kidney/testicle)   Yes       No      Birth Defects? Developmental delay?    Yes       No    Yes       No  [facility] DIABETES SCREENING  BMI>85% age/sex  No And any two of the following:  Family History   no           Ethnic Minority    yes                   Signs of Insulin no Resistance (hypertension, dyslipidemia, polycystic ovarian syndrome, acanthosis nigricans) Controller medication (e.g. inhaled corticosteroid):   No Other   NEEDS/MODIFICATIONS required in the school setting  None DIETARY Needs/Restrictions     None   SPECIAL INSTRUCTIONS/DEVICES e.g. safety glasses, glass eye, chest protector for arrhyt

## (undated) NOTE — MR AVS SNAPSHOT
[address]  [address] [facility]  [phone]               Thank you for choosing us for your health care visit with VICKI Rodgers.   We are glad to serve you and happy to provide you with this summa Here are some topics you, your teen, and the healthcare provider may want to discuss during this visit:  · School performance. How is your child doing in school? Is homework finished on time? Does your child stay organized?  These are skills you can help wi interest in dating and becoming “more than friends” with other kids. Also, it’s normal for your teen to be juárez. Try to be patient and consistent. Encourage conversations, even when he or she doesn’t seem to want to talk.  No matter how your teen acts, he family time. It also lets you see what and how your child eats.   · Limit soda and juice drinks. A small soda is OK once in a while. But soda, sports drinks, and juice drinks are no substitute for healthier drinks. Sports and juice drinks are no better.  Wa phone, text, or listen to music with headphones while he or she is riding a bike or walking outdoors, especially when crossing the street. · Constant loud music can cause hearing damage, so monitor your teen’s music volume.  Many music players let you set · Sudden changes in eating or sleeping habits  · Sexual promiscuity or unplanned pregnancy  · Hostile behavior or rage  · Loss of pleasure in life  Depressed teens can be helped with treatment. Talk to your child’s healthcare provider.  Or check with your l Educational Information     Healthy Active Living  An initiative of the American Academy of Pediatrics    Fact Sheet: Healthy Active Living for Families    Healthy nutrition starts as early as infancy with breastfeeding.  Once your baby begins eating solid Visit [facility] online at  [email or web address]

## (undated) NOTE — LETTER
VACCINE ADMINISTRATION RECORD  PARENT / GUARDIAN APPROVAL  Date: 3/9/2018  Vaccine administered to: Andria Conner     : 2000    MRN: [id number]    A copy of the appropriate Centers for Disease Control and Prevention Vaccine Information statement

## (undated) NOTE — LETTER
8/4/2021              [phone]         To Whom It May Concern,    Riya Wheatley is under my medical care. Please excuse her from work from 8/4/2021 through 8/8/2021.   She may resume

## (undated) NOTE — LETTER
Name:  Gregg Martínez Year:  12th Grade Class: Student ID No.:   Address:   [address]  [facility] [address] [address] Phone:  [phone] (home) [phone] (work) :  24year old   Name Relationship [address] Work Phone Home Phone Mobile Phone syndrome, arrhythmogenic right ventricular cardiomyopathy, long QT syndrome, short QT syndrome, Brugada syndrome, or catecholaminergic polymorphic ventricular tachycardia?      13. Does anyone in your family have a heart problem, pacemaker, or implanted def 39. Do you have a history of seizure disorder?     37. Do you have headaches with exercise? 38. Have you ever had numbness, tingling, or weakness in your arms or legs after being hit or falling?      39.Have you ever been unable to move your arms / legs MVP, aortic insufficiency) Yes    Eyes/Ears/Nose/Throat:  Pupils equal    Hearing Yes    Lymph nodes Yes    Heart*  · Murmurs (auscultation standing, supine, +/- Valsalva)  · Location of point of maximal impulse (PMI) Yes    Pulses Yes    Lungs Yes    Abdo defined in the [facility] Performance-Enhancing Substance Testing Program Protocol.  We have reviewed the policy and understand that I/our student may be asked to submit to testing for the presence of performance-enhancing substances in my/his/her body either dur

## (undated) NOTE — LETTER
[facility] [employer] Child Health Examination       Student's Name  Edmond LOBATO Title       MD                    Date   3/9/2018   Signature                                                                                                                                              Title HEALTH HISTORY          TO BE COMPLETED AND SIGNED BY PARENT/GUARDIAN AND VERIFIED BY HEALTH CARE PROVIDER    ALLERGIES  (Food, drug, insect, other) MEDICATION  (List all prescribed or taken on a regular basis.)     Diagnosis of asthma?   Child wakes during DIABETES SCREENING  BMI>85% age/sex  No And any two of the following:  Family History No   Ethnic Minority  No          Signs of Insulin Resistance (hypertension, dyslipidemia, polycystic ovarian syndrome, acanthosis nigricans)    No           At Risk  No Quick-relief  medication (e.g. Short Acting Beta Antagonist): No          Controller medication (e.g. inhaled corticosteroid):   No Other   NEEDS/MODIFICATIONS required in the school setting  None DIETARY Needs/Restrictions     None   SPECIAL INSTR

## (undated) NOTE — LETTER
[facility] [employer] Child Health Examination       Student's Name  Tanner Catalan D Title                           Date     Signature                                                                                                                                              Title                           Date    (If adding date VERIFIED BY HEALTH CARE PROVIDER    ALLERGIES  (Food, drug, insect, other)  Patient has no known allergies.  MEDICATION  (List all prescribed or taken on a regular basis.)    Current Outpatient Prescriptions:   •  Doxycycline Hyclate 100 MG Oral Cap, Take 1 PHYSICAL EXAMINATION REQUIREMENTS    Entire section below to be completed by MD/DO/APN/PA       PHYSICAL EXAMINATION REQUIREMENTS (head circumference if <33 years old):   /72   Ht 5' 1\" (1.549 m)   Wt 49.4 kg (109 lb)   BMI 20.60 kg/m²     DIABETES Mouth/Dental Yes  Spinal examination Yes    Cardiovascular/HTN Yes  Nutritional status Yes    Respiratory Yes                   Diagnosis of Asthma: No Mental Health Yes        Currently Prescribed Asthma Medication:            Quick-relief  medication (e.